# Patient Record
Sex: FEMALE | Race: WHITE | NOT HISPANIC OR LATINO
[De-identification: names, ages, dates, MRNs, and addresses within clinical notes are randomized per-mention and may not be internally consistent; named-entity substitution may affect disease eponyms.]

---

## 2022-12-15 ENCOUNTER — APPOINTMENT (OUTPATIENT)
Dept: OBGYN | Facility: CLINIC | Age: 57
End: 2022-12-15

## 2022-12-15 VITALS
WEIGHT: 199 LBS | SYSTOLIC BLOOD PRESSURE: 132 MMHG | HEART RATE: 102 BPM | BODY MASS INDEX: 33.15 KG/M2 | DIASTOLIC BLOOD PRESSURE: 92 MMHG | HEIGHT: 65 IN

## 2022-12-15 DIAGNOSIS — U07.1 COVID-19: ICD-10-CM

## 2022-12-15 DIAGNOSIS — Z00.00 ENCOUNTER FOR GENERAL ADULT MEDICAL EXAMINATION W/OUT ABNORMAL FINDINGS: ICD-10-CM

## 2022-12-15 PROCEDURE — 99386 PREV VISIT NEW AGE 40-64: CPT

## 2022-12-15 NOTE — PHYSICAL EXAM
[Chaperone Present] : A chaperone was present in the examining room during all aspects of the physical examination [Appropriately responsive] : appropriately responsive [Alert] : alert [No Acute Distress] : no acute distress [No Lymphadenopathy] : no lymphadenopathy [Regular Rate Rhythm] : regular rate rhythm [No Murmurs] : no murmurs [Clear to Auscultation B/L] : clear to auscultation bilaterally [Soft] : soft [Non-tender] : non-tender [Non-distended] : non-distended [No HSM] : No HSM [No Lesions] : no lesions [No Mass] : no mass [Oriented x3] : oriented x3 [Examination Of The Breasts] : a normal appearance [No Masses] : no breast masses were palpable [Vulvar Atrophy] : vulvar atrophy [Labia Majora] : normal [Labia Minora] : normal [Atrophy] : atrophy [Polyp ___ cm] : [unfilled] ~[facility] polyp [Normal] : normal [Anteversion] : anteverted [Uterine Adnexae] : normal [FreeTextEntry6] : No masses, nontender, no skin changes, no nipple discharge, no adenopathy. [Tenderness] : nontender [Mass ___ cm] : no uterine mass was palpated

## 2022-12-15 NOTE — PLAN
[FreeTextEntry1] : Patient is a 57-year-old  3 para 3 last menstrual period 2018\par Patient presents as initial first-time GYN patient complaining of some vaginal spotting over the past 2 days\par Physical exam reveals a well-developed well-nourished female slightly obese,,, BMI 33\par Heart regular rhythm and rate, lungs clear, breast no mass nontender no skin or nipple discharge no adenopathy, abdomen soft nontender no organomegaly.\par Of exam shows normal female external genitalia, vagina no lesions atrophic, cervix appropriate size evidence of a 0.5 cm polyp from the external os, uterus anteverted small nontender, adnexa no mass nontender.\par Pap smear was performed\par Patient will be given a prescription for breast mammogram\par Patient to return for pelvic sonogram to assess endometrial thickness at the same time will be having a cyst polypectomy and possible endometrial biopsy\par Findings discussed the patient\par Questions answered\par \par Past medical history.... History of COVID diagnosis back in 2021\par Patient denies being vaccinated boosted and denies any residual symptoms or deficits\par Past surgical history,,, patient denies\par Past OB history,,, vaginal deliveries x3\par Past GYN history,,, medication 14, interval 28, duration 5 days,,, patient denies history of birth control pill use history of PID or STDs\par Patient does state she had freezing of the cervix x1 time back in her 20s\par Allergies,,, to penicillin\par Medications,,, patient denies\par Patient denies any tobacco alcohol drug use\par Family history significant mother alive history of bladder cancer,,, father  with history of pancreatic cancer\par Patient denies any family history of breast or ovarian cancers\par Patient will be schedule colonoscopy\par \par Stated patient will return for pelvic sonogram to assess endometrial thickness and also at the same visit will undergo cervical polypectomy and probably endometrial biopsy for further assessment

## 2022-12-15 NOTE — HISTORY OF PRESENT ILLNESS
[FreeTextEntry1] : Patient is a 57-year-old  3 para 3 last menstrual period 2018\par Patient presents for initial GYN visit and states that she has been having some vaginal spotting over the past 2 days
no

## 2022-12-29 ENCOUNTER — APPOINTMENT (OUTPATIENT)
Dept: OBGYN | Facility: CLINIC | Age: 57
End: 2022-12-29
Payer: COMMERCIAL

## 2022-12-29 ENCOUNTER — ASOB RESULT (OUTPATIENT)
Age: 57
End: 2022-12-29

## 2022-12-29 VITALS
WEIGHT: 199 LBS | HEIGHT: 65 IN | DIASTOLIC BLOOD PRESSURE: 83 MMHG | SYSTOLIC BLOOD PRESSURE: 135 MMHG | HEART RATE: 114 BPM | BODY MASS INDEX: 33.15 KG/M2

## 2022-12-29 PROCEDURE — ZZZZZ: CPT

## 2022-12-29 PROCEDURE — 76856 US EXAM PELVIC COMPLETE: CPT | Mod: 59

## 2022-12-29 PROCEDURE — 76830 TRANSVAGINAL US NON-OB: CPT

## 2022-12-29 PROCEDURE — 81025 URINE PREGNANCY TEST: CPT

## 2022-12-29 PROCEDURE — 58100 BIOPSY OF UTERUS LINING: CPT

## 2022-12-29 NOTE — ASSESSMENT
[FreeTextEntry1] : Patient is a 57-year-old  3 para 3 last menstrual period 2018\par Patient presents for sonogram valuation and endometrial biopsy and polypectomy\par Patient had some vaginal spotting approximately 2 weeks prior\par Pelvic sonogram\par Uterus 6.06 x 4 x 4.02\par Endometrial thickness 3.1 mm\par Large nabothian cervical cyst measuring 1.4 x 0.89 x 1.28\par No fluid in the cul-de-sac\par Right ovary 2.08 x 1.7 x 1.8\par Left ovary 1.54 x 1.82 x 1.27\par Both transabdominal and transvaginal sonograms were performed to further  evaluate and determine accurate pelvic anatomy for endometrial thickness\par Patient was placed in the exam room and after obtaining consent, patient was placed in the stirrups and sterile speculum was placed in the vaginal vault\par Vaginal vault cleansed with Betadine solution\par Cervical polyp visualized at the cervical external os and grasped with a polyp forceps and through clockwise rotation the polyp was removed in its entirety and sent to pathology\par The anterior lip of the cervix was grasped with a single toothed tenaculum and a endometrial biopsy Pipelle was reduced endometrial cavity and a sample was obtained which the amount was approximately scant to moderate\par Specimen was sent to pathology as a separate specimen\par Patient reassured since the endometrial lining was 3.1 mm and scant tissue was obtained from the endometrial sample the most likely etiology for the vaginal spotting was a cervical polyp and not necessarily a uterine pathology\par Patient that she understands\par Follow-up 2 weeks\par

## 2022-12-29 NOTE — PROCEDURE
[Endometrial Biopsy] : Endometrial biopsy [Time out performed] : Pre-procedure time out performed.  Patient's name, date of birth and procedure confirmed. [Consent Obtained] : Consent obtained [Pre-op Evaluation] : Pre-op evaluation [Post-Menop. Bleeding] : post-menopausal bleeding [Risks] : risks [Alternatives] : alternatives [Patient] : patient [Infection] : infection [Bleeding] : bleeding [Negative] : negative pregnancy test [No Premedication] : No premedication [None] : none [Tenaculum] : Tenaculum [Easy Passage] : Easy passage [Scant] : scant [Specimen Collected] : collected [Tolerated Well] : Patient tolerated the procedure well [No Complications] : No complications [LMPDate] : 11/1/2018

## 2023-01-01 ENCOUNTER — TRANSCRIPTION ENCOUNTER (OUTPATIENT)
Age: 58
End: 2023-01-01

## 2023-01-03 LAB — HPV HIGH+LOW RISK DNA PNL CVX: NOT DETECTED

## 2023-01-05 LAB — CORE LAB BIOPSY: NORMAL

## 2023-01-12 ENCOUNTER — APPOINTMENT (OUTPATIENT)
Dept: OBGYN | Facility: CLINIC | Age: 58
End: 2023-01-12
Payer: COMMERCIAL

## 2023-01-12 VITALS
SYSTOLIC BLOOD PRESSURE: 141 MMHG | DIASTOLIC BLOOD PRESSURE: 86 MMHG | HEIGHT: 65 IN | BODY MASS INDEX: 33.15 KG/M2 | HEART RATE: 116 BPM | WEIGHT: 199 LBS

## 2023-01-12 PROCEDURE — 99213 OFFICE O/P EST LOW 20 MIN: CPT

## 2023-01-12 NOTE — PLAN
[FreeTextEntry1] : Patient is a 57-year-old  3 para 3 last menstrual period 2018\par Patient presents for postop visit after undergoing a cervical polypectomy endometrial biopsy for history of postmenopausal bleeding\par Polypectomy was performed on 9022 along with endometrial biopsy which showed,,, atrophic endometrium, benign endocervical tissue, benign endocervical polyp\par Pap smear negative HPV negative results discussed with patient reassured\par Patient advised to consider being scheduled for colonoscopy\par Follow-up 1 year or prior to that as needed

## 2023-01-12 NOTE — HISTORY OF PRESENT ILLNESS
[FreeTextEntry1] : Patient is a 57-year-old  3 para 3 last menstrual period 2018\par Patient presents for follow-up after undergoing a cervical polypectomy Karla biopsy for postmenopausal bleeding